# Patient Record
Sex: FEMALE | Race: WHITE
[De-identification: names, ages, dates, MRNs, and addresses within clinical notes are randomized per-mention and may not be internally consistent; named-entity substitution may affect disease eponyms.]

---

## 2020-11-07 ENCOUNTER — HOSPITAL ENCOUNTER (INPATIENT)
Dept: HOSPITAL 54 - ER | Age: 70
LOS: 1 days | Discharge: HOME | DRG: 74 | End: 2020-11-08
Attending: FAMILY MEDICINE | Admitting: FAMILY MEDICINE
Payer: MEDICARE

## 2020-11-07 VITALS — WEIGHT: 126 LBS | BODY MASS INDEX: 20.25 KG/M2 | HEIGHT: 66 IN

## 2020-11-07 VITALS — DIASTOLIC BLOOD PRESSURE: 73 MMHG | SYSTOLIC BLOOD PRESSURE: 122 MMHG

## 2020-11-07 VITALS — DIASTOLIC BLOOD PRESSURE: 66 MMHG | SYSTOLIC BLOOD PRESSURE: 140 MMHG

## 2020-11-07 DIAGNOSIS — E44.1: ICD-10-CM

## 2020-11-07 DIAGNOSIS — Z87.891: ICD-10-CM

## 2020-11-07 DIAGNOSIS — M54.12: Primary | ICD-10-CM

## 2020-11-07 DIAGNOSIS — M54.81: ICD-10-CM

## 2020-11-07 DIAGNOSIS — Z98.890: ICD-10-CM

## 2020-11-07 DIAGNOSIS — Z83.3: ICD-10-CM

## 2020-11-07 DIAGNOSIS — M26.602: ICD-10-CM

## 2020-11-07 LAB
ALBUMIN SERPL BCP-MCNC: 3.3 G/DL (ref 3.4–5)
ALP SERPL-CCNC: 41 U/L (ref 46–116)
ALT SERPL W P-5'-P-CCNC: 23 U/L (ref 12–78)
AST SERPL W P-5'-P-CCNC: 19 U/L (ref 15–37)
BASOPHILS # BLD AUTO: 0 /CMM (ref 0–0.2)
BASOPHILS NFR BLD AUTO: 0.6 % (ref 0–2)
BILIRUB DIRECT SERPL-MCNC: 0.1 MG/DL (ref 0–0.2)
BILIRUB SERPL-MCNC: 0.7 MG/DL (ref 0.2–1)
BUN SERPL-MCNC: 17 MG/DL (ref 7–18)
CALCIUM SERPL-MCNC: 8.5 MG/DL (ref 8.5–10.1)
CHLORIDE SERPL-SCNC: 107 MMOL/L (ref 98–107)
CO2 SERPL-SCNC: 26 MMOL/L (ref 21–32)
CREAT SERPL-MCNC: 0.6 MG/DL (ref 0.6–1.3)
EOSINOPHIL NFR BLD AUTO: 3 % (ref 0–6)
GLUCOSE SERPL-MCNC: 102 MG/DL (ref 74–106)
HCT VFR BLD AUTO: 43 % (ref 33–45)
HGB BLD-MCNC: 14.1 G/DL (ref 11.5–14.8)
LYMPHOCYTES NFR BLD AUTO: 1.6 /CMM (ref 0.8–4.8)
LYMPHOCYTES NFR BLD AUTO: 34.7 % (ref 20–44)
MCHC RBC AUTO-ENTMCNC: 33 G/DL (ref 31–36)
MCV RBC AUTO: 97 FL (ref 82–100)
MONOCYTES NFR BLD AUTO: 0.5 /CMM (ref 0.1–1.3)
MONOCYTES NFR BLD AUTO: 9.9 % (ref 2–12)
NEUTROPHILS # BLD AUTO: 2.4 /CMM (ref 1.8–8.9)
NEUTROPHILS NFR BLD AUTO: 51.8 % (ref 43–81)
NT-PROBNP SERPL-MCNC: 216 PG/ML (ref 0–125)
PLATELET # BLD AUTO: 207 /CMM (ref 150–450)
POTASSIUM SERPL-SCNC: 4 MMOL/L (ref 3.5–5.1)
PROT SERPL-MCNC: 6.8 G/DL (ref 6.4–8.2)
RBC # BLD AUTO: 4.38 MIL/UL (ref 4–5.2)
SODIUM SERPL-SCNC: 141 MMOL/L (ref 136–145)
TSH SERPL DL<=0.005 MIU/L-ACNC: 0.83 UIU/ML (ref 0.36–3.74)
WBC NRBC COR # BLD AUTO: 4.7 K/UL (ref 4.3–11)

## 2020-11-07 PROCEDURE — G0378 HOSPITAL OBSERVATION PER HR: HCPCS

## 2020-11-07 PROCEDURE — C9803 HOPD COVID-19 SPEC COLLECT: HCPCS

## 2020-11-07 RX ADMIN — Medication SCH EACH: at 16:56

## 2020-11-07 RX ADMIN — ACETAMINOPHEN PRN MG: 325 TABLET ORAL at 20:11

## 2020-11-07 RX ADMIN — Medication SCH EACH: at 22:00

## 2020-11-07 NOTE — NUR
TELE RN OPENING NOTES: RECEIVED PATIENT IN BED, AWAKE. A/O X4. NO S/S OF DISTRESS NOTED. 
COMPLAINED OF SLIGHT HEADACHE. CALL LIGHT WITHIN REACH. BED IN LOWEST AND LOCKED POSITION. 
AMBULATORY, STEADY GAIT.

## 2020-11-07 NOTE — NUR
BIB SELF C/O L JAW PAIN AND TINGLING SENSATION STARTED YESTERDAY 1430H, TO ER 
BED 9, HOOKED TO MONITOR, CHANGED TO HOSP GOWN, PROVIDED W WARM BLABKET, 
AWAITING MD ALCAZAR

## 2020-11-07 NOTE — NUR
TELE RN NOTES



PATIENT RESTING COMFORTABLY IN BED. NO SOB. DENIES AND C/O PAIN/DISCOMFORT AT THIS TIME. 
REMAIN ON TELE MONITORING SR. STILL NO DRIFT OBSERVED IN ALL EXTREMITIES. RIGHT AC # 18 SL 
INTACT AND PATENT. AMBULATORY WITH STEADY GAIT. BED IN LOWEST POSITION, LOCKED. CALL LIGHT 
WITHIN REACH. ABLE TO VERBALIZE NEEDS. IN NO APPARENT DISTRESS.

## 2020-11-07 NOTE — NUR
TEL RN NOTES



ADMITTED 71 Y/O FEMALE TO UNIT. A/O X 4.  NO SOB. SPO2 ROOM AIR 99% DENIES AND C/O 
PAIN/DISCOMFORT AT THIS TIME. ABLE TO MOVE ALL EXTREMITIES WITHOUT DIFFICULTY. ON TELE 
MONITORING SR: 72. NO DRIFT OBSERVED UPON ASSESSMENT IN ALL EXTREMITIES. RIGHT AC # 18 SL 
INTACT AND PATENT. AMBULATORY WITH STEADY GAIT. ORIENTED TO ROOM, UNIT AND CALL LIGHT. BEDIN 
LOWEST POSITION, LOCKED. CALL LIGHT WITHIN REACH. ABLE TO VERBALIZE NEEDS.

## 2020-11-08 VITALS — SYSTOLIC BLOOD PRESSURE: 106 MMHG | DIASTOLIC BLOOD PRESSURE: 51 MMHG

## 2020-11-08 VITALS — SYSTOLIC BLOOD PRESSURE: 119 MMHG | DIASTOLIC BLOOD PRESSURE: 84 MMHG

## 2020-11-08 VITALS — SYSTOLIC BLOOD PRESSURE: 110 MMHG | DIASTOLIC BLOOD PRESSURE: 46 MMHG

## 2020-11-08 LAB
BASOPHILS # BLD AUTO: 0 /CMM (ref 0–0.2)
BASOPHILS NFR BLD AUTO: 0.6 % (ref 0–2)
BUN SERPL-MCNC: 17 MG/DL (ref 7–18)
CALCIUM SERPL-MCNC: 8.7 MG/DL (ref 8.5–10.1)
CHLORIDE SERPL-SCNC: 106 MMOL/L (ref 98–107)
CHOLEST SERPL-MCNC: 229 MG/DL (ref ?–200)
CO2 SERPL-SCNC: 27 MMOL/L (ref 21–32)
CREAT SERPL-MCNC: 0.6 MG/DL (ref 0.6–1.3)
EOSINOPHIL NFR BLD AUTO: 2.3 % (ref 0–6)
GLUCOSE SERPL-MCNC: 95 MG/DL (ref 74–106)
HCT VFR BLD AUTO: 40 % (ref 33–45)
HDLC SERPL-MCNC: 58 MG/DL (ref 40–60)
HGB BLD-MCNC: 13.6 G/DL (ref 11.5–14.8)
LDLC SERPL DIRECT ASSAY-MCNC: 148 MG/DL (ref 0–99)
LYMPHOCYTES NFR BLD AUTO: 1.8 /CMM (ref 0.8–4.8)
LYMPHOCYTES NFR BLD AUTO: 32.3 % (ref 20–44)
MAGNESIUM SERPL-MCNC: 2.2 MG/DL (ref 1.8–2.4)
MCHC RBC AUTO-ENTMCNC: 34 G/DL (ref 31–36)
MCV RBC AUTO: 96 FL (ref 82–100)
MONOCYTES NFR BLD AUTO: 0.6 /CMM (ref 0.1–1.3)
MONOCYTES NFR BLD AUTO: 11.7 % (ref 2–12)
NEUTROPHILS # BLD AUTO: 2.9 /CMM (ref 1.8–8.9)
NEUTROPHILS NFR BLD AUTO: 53.1 % (ref 43–81)
PHOSPHATE SERPL-MCNC: 3.7 MG/DL (ref 2.5–4.9)
PLATELET # BLD AUTO: 214 /CMM (ref 150–450)
POTASSIUM SERPL-SCNC: 3.9 MMOL/L (ref 3.5–5.1)
RBC # BLD AUTO: 4.21 MIL/UL (ref 4–5.2)
SODIUM SERPL-SCNC: 141 MMOL/L (ref 136–145)
TRIGL SERPL-MCNC: 124 MG/DL (ref 30–150)
WBC NRBC COR # BLD AUTO: 5.5 K/UL (ref 4.3–11)

## 2020-11-08 RX ADMIN — ACETAMINOPHEN PRN MG: 325 TABLET ORAL at 06:55

## 2020-11-08 RX ADMIN — Medication SCH EACH: at 06:58

## 2020-11-08 NOTE — NUR
RN  NOTE



THE COMPLAINS OF HEADACHE 3/10. RECEIVED AN ORDER FROM DR JAVIER MOTRIN 600 MG Q6HR PRN. 
NOTED AND CARRIED OUT.

## 2020-11-08 NOTE — NUR
RN  NOTE



THE PATIENT COMPLAINED OF HEADACHE 3/10. MOTRIN 600 MG PO IS GIVEN. WILL CONTINUE TO 
MONITOR.

## 2020-11-08 NOTE — NUR
TELE RN CLOSING NOTES: PATIENT IN BED,ASLEEP, AROUSABLE. NO S/S OF DISTRESS NOTED. NO 
COMPLAIN OF PAIN. CALL LIGHT WITHIN REACH. BED IN LOWEST AND LOCKED POSITION. AMBULATORY. 
RESTED THROUGHOUT THE NIGHT.

## 2020-11-08 NOTE — NUR
RN  NOTE



THE PATIENT IS RECEIVED IN BED. PATIENT IS ALERT AND ORIENTED X4. DENIES PAIN. THE PATIENT 
IS IN ROOM AIR AND DENIES SOB. RESPIRATION REGULAR AND UNLABORED. THE PATIENT VERBALIZES 
FEELING LIGHT TINGLING ON THE LEFT SIDE OF THE FACE. DR GRUBBS IS MADE AWARE. RAC G 18 
PATENT AND SALINE LOCKED. BED LOW AND LOCKED. SIDE RAILS UP X3. CALL LIGHT WITHIN REACH. 
WILL CONTINUE TO MONITOR.

-------------------------------------------------------------------------------

Addendum: 11/08/20 at 0912 by CAMACHO CHU RN

-------------------------------------------------------------------------------

SABRINA NOTE



TELE BOX READING IS SR 70.

## 2020-11-08 NOTE — NUR
RN  NOTE



THE PATIENT ALERT AND ORIENTED X4. DENIES PAIN. IN ROOM AIR AND SATURATION  IS AT 97%. 
RESPIRATION REGULAR AND UNLABORED. THE PATIENT IN NO APPARENT DISTRESS. DISCHARGE EDUCATION 
PROVIDED TO THE PATIENT AND SHE VERBALIZED UNDERSTANDING. THE PATIENT LEFT THE HOSPITAL IN 
STABLE CONDITION.

## 2020-11-09 NOTE — NUR
consult requested by Dr. Santosh Moser for TIA. SW unable to assess 
patient as patient was discharge over the weekend.